# Patient Record
Sex: MALE | Race: WHITE | URBAN - METROPOLITAN AREA
[De-identification: names, ages, dates, MRNs, and addresses within clinical notes are randomized per-mention and may not be internally consistent; named-entity substitution may affect disease eponyms.]

---

## 2012-10-12 LAB — CREAT SERPL-MCNC: 0.81 MG/DL (ref 0.72–1.25)

## 2015-10-02 LAB
CREAT SERPL-MCNC: 0.93 MG/DL (ref 0.72–1.25)
GLUCOSE BLD-MCNC: 88 MG/DL (ref 65–100)

## 2017-06-15 ENCOUNTER — COMMUNICATION - RIVER FALLS (OUTPATIENT)
Dept: FAMILY MEDICINE | Facility: CLINIC | Age: 67
End: 2017-06-15

## 2017-06-15 ENCOUNTER — OFFICE VISIT - RIVER FALLS (OUTPATIENT)
Dept: FAMILY MEDICINE | Facility: CLINIC | Age: 67
End: 2017-06-15

## 2017-06-15 LAB
CREAT SERPL-MCNC: 0.9 MG/DL (ref 0.72–1.25)
GLUCOSE BLD-MCNC: 103 MG/DL (ref 65–100)

## 2022-02-11 VITALS
DIASTOLIC BLOOD PRESSURE: 80 MMHG | BODY MASS INDEX: 36.79 KG/M2 | HEART RATE: 76 BPM | TEMPERATURE: 97.7 F | WEIGHT: 263.8 LBS | SYSTOLIC BLOOD PRESSURE: 136 MMHG

## 2022-02-16 NOTE — PROGRESS NOTES
Patient:   TIP MCCANN            MRN: [id number]            FIN: [id number]               Age:   66 years     Sex:  Male     :  1950   Associated Diagnoses:   Chest pain; Lightheadedness; Fatigue   Author:   Paul Gillette MD      Chief Complaint   6/15/2017 10:42 AM CDT   Chest pain on and off x few weeks      History of Present Illness   Patient with intermittent chest pain for the past few weeks.   Primary symptom has been brief episodes of feeling lightheaded and faint, lasts only a few seconds. Has had brief episodes for more than a year, but lately has had symptoms a few times a week.  Has also had intermittent chest wall pain, tenderness, initially thought it felt like a pulled muscle but then started worrying it was his heart.  Has not had any shortness of breath.  Has had some decreased exercise tolerance but thinks it is related to his knee.   Patient has a history of smoking but quit many years ago, does use chewing tobacco.  Dad  of pancreatic cancer at age 61, mom lived to be 91 and was healthy. No known family history of heart disease. No known family hx of diabetes or HTN.   No pain currently. Notes he has had friends who had chest pain and ended up with stents, worried he could be ignoring serious condition.      Review of Systems   Constitutional:  Sweats, Decreased activity.    Eye:  Negative.    Ear/Nose/Mouth/Throat:  Negative.    Respiratory:  Negative.    Cardiovascular:  Negative except as documented in history of present illness, No palpitations, No peripheral edema, No syncope.    Gastrointestinal:  Nausea, belching frequently, decreased appetite.    Genitourinary:  Negative.    Musculoskeletal:  Negative except as documented in history of present illness.       Health Status   Allergies:    Allergic Reactions (All)  No known allergies   Medications: no daily medications   Problem list:    All Problems  Adenomatous colon polyp / SNOMED CT 5453430841 / Confirmed  Diverticulosis  of large intestine / SNOMED CT 1388233593 / Confirmed  Gout / SNOMED CT 990758409 / Confirmed  Obesity / ICD-9-.00 / Probable  Tobacco abuse / ICD-9-.1 / Confirmed      Histories   Family History:    Cancer  Father     Procedure history:    Colonoscopy ([id number]) on 6/3/2015 at 64 Years.  Comments:  6/5/2015 12:38 PM - Joao Walker MD  Indication: Screening  Sedation: Fentanyl 150 mcg, Versed 3 mg  Findings: Tiny tubular adenoma proximal ascending, Severe diverticulosis ascending, mild rectosigmoid diverticulosis  REcommendation: Repeat in 5 years, high fiber diet   Social History:        Alcohol Assessment: Current            12 pk/day      Tobacco Assessment: Current            Current, Snuff      Employment and Education Assessment            [employer] - maintenance.      Home and Environment Assessment            Marital status: .  Spouse/Partner name: Lacy.        Physical Examination   Vital Signs   6/15/2017 10:42 AM CDT Temperature Tympanic 97.7 DegF  LOW    Peripheral Pulse Rate 76 bpm    Pulse Site Radial artery    HR Method Manual    Systolic Blood Pressure 152 mmHg  HI    Diastolic Blood Pressure 78 mmHg    Mean Arterial Pressure 103 mmHg    BP Site Right arm    BP Method Manual      Measurements from flowsheet : Measurements   6/15/2017 10:42 AM CDT   Weight Measured - Standard                263.8 lb     General:  Alert and oriented, No acute distress.    Eye:  Pupils are equal, round and reactive to light, Normal conjunctiva.    HENT:  Normocephalic, Tympanic membranes are clear, Normal hearing, No pharyngeal erythema.    Neck:  Supple, Non-tender, No lymphadenopathy, No thyromegaly.    Respiratory:  Lungs are clear to auscultation, Respirations are non-labored, Breath sounds are equal, No chest wall tenderness.    Cardiovascular:  Normal rate, Regular rhythm.    Gastrointestinal:  Soft, Non-tender, Non-distended, Normal bowel sounds, No organomegaly.    Musculoskeletal:   Normal range of motion, No deformity.    Integumentary:  Warm, Dry.    Neurologic:  Alert, Oriented.       Review / Management   ECG interpretation:  Within normal limits, Normal sinus rhythm.       Impression and Plan   Diagnosis     Chest pain (OOC14-BE R07.9).     Lightheadedness (PVU06-NE R42).     Fatigue (DKS57-ZJ R53.83).     Course:  Will check labs today. If recurrence of chest pain, to ER. If all tests are negative, will refer for stress test. I will call patient with results today..